# Patient Record
Sex: MALE | Race: WHITE | NOT HISPANIC OR LATINO | Employment: OTHER | URBAN - METROPOLITAN AREA
[De-identification: names, ages, dates, MRNs, and addresses within clinical notes are randomized per-mention and may not be internally consistent; named-entity substitution may affect disease eponyms.]

---

## 2017-08-26 DIAGNOSIS — N13.8 BENIGN PROSTATIC HYPERPLASIA WITH URINARY OBSTRUCTION: ICD-10-CM

## 2017-08-26 DIAGNOSIS — N40.1 BENIGN PROSTATIC HYPERPLASIA WITH URINARY OBSTRUCTION: ICD-10-CM

## 2017-08-28 RX ORDER — TAMSULOSIN HYDROCHLORIDE 0.4 MG/1
CAPSULE ORAL
Qty: 30 CAPSULE | Refills: 0 | Status: SHIPPED | OUTPATIENT
Start: 2017-08-28 | End: 2017-11-22 | Stop reason: SDUPTHER

## 2017-11-22 DIAGNOSIS — N13.8 BENIGN PROSTATIC HYPERPLASIA WITH URINARY OBSTRUCTION: ICD-10-CM

## 2017-11-22 DIAGNOSIS — N40.1 BENIGN PROSTATIC HYPERPLASIA WITH URINARY OBSTRUCTION: ICD-10-CM

## 2017-11-22 RX ORDER — TAMSULOSIN HYDROCHLORIDE 0.4 MG/1
CAPSULE ORAL
Qty: 30 CAPSULE | Refills: 11 | Status: SHIPPED | OUTPATIENT
Start: 2017-11-22 | End: 2018-03-22 | Stop reason: SDUPTHER

## 2018-03-22 PROBLEM — N18.30 CKD (CHRONIC KIDNEY DISEASE) STAGE 3, GFR 30-59 ML/MIN: Status: ACTIVE | Noted: 2018-03-22

## 2018-05-10 PROBLEM — R13.10 DYSPHAGIA: Status: ACTIVE | Noted: 2018-05-10

## 2019-08-12 PROBLEM — M54.9 BACK PAIN: Status: ACTIVE | Noted: 2019-08-12

## 2020-03-11 PROBLEM — M19.041 ARTHRITIS OF BOTH HANDS: Status: ACTIVE | Noted: 2020-03-11

## 2020-03-11 PROBLEM — M19.042 ARTHRITIS OF BOTH HANDS: Status: ACTIVE | Noted: 2020-03-11

## 2021-04-06 PROBLEM — I35.0 AORTIC VALVE STENOSIS: Status: ACTIVE | Noted: 2021-04-06

## 2021-10-27 PROBLEM — Z95.0 PACEMAKER: Status: ACTIVE | Noted: 2021-10-27

## 2021-10-27 PROBLEM — Z99.89 WALKER AS AMBULATION AID: Status: ACTIVE | Noted: 2021-10-27

## 2021-10-27 PROBLEM — I25.118 ATHEROSCLEROTIC HEART DISEASE OF NATIVE CORONARY ARTERY WITH OTHER FORMS OF ANGINA PECTORIS: Status: ACTIVE | Noted: 2021-10-27

## 2021-10-27 PROBLEM — Z95.2 S/P TAVR (TRANSCATHETER AORTIC VALVE REPLACEMENT): Status: ACTIVE | Noted: 2021-10-27

## 2021-10-27 PROBLEM — G62.9 POLYNEUROPATHY: Status: ACTIVE | Noted: 2021-10-27

## 2021-10-27 PROBLEM — U07.1 COVID-19: Status: ACTIVE | Noted: 2021-09-20

## 2022-10-31 PROBLEM — N18.4 CHRONIC KIDNEY DISEASE, STAGE 4 (SEVERE): Status: ACTIVE | Noted: 2022-10-31

## 2022-10-31 PROBLEM — J61 PNEUMOCONIOSIS DUE TO ASBESTOS AND OTHER MINERAL FIBERS: Status: ACTIVE | Noted: 2022-10-31

## 2022-10-31 PROBLEM — I50.32 CHRONIC DIASTOLIC CONGESTIVE HEART FAILURE: Status: ACTIVE | Noted: 2022-10-31

## 2022-10-31 PROBLEM — Z66 DNR (DO NOT RESUSCITATE): Status: ACTIVE | Noted: 2022-10-31

## 2023-05-11 PROBLEM — Z71.89 ADVANCE CARE PLANNING: Status: ACTIVE | Noted: 2023-05-11

## 2023-05-11 PROBLEM — M86.9 KNEE OSTEOMYELITIS, LEFT: Status: ACTIVE | Noted: 2023-05-11

## 2023-05-11 PROBLEM — E87.1 HYPONATREMIA: Status: ACTIVE | Noted: 2023-05-11

## 2023-05-11 PROBLEM — I50.9 CHF (CONGESTIVE HEART FAILURE): Status: ACTIVE | Noted: 2023-05-11

## 2024-02-12 PROBLEM — R26.2 AMBULATORY DYSFUNCTION: Status: ACTIVE | Noted: 2024-02-12

## 2024-02-12 PROBLEM — F10.21 ALCOHOLISM IN REMISSION: Status: ACTIVE | Noted: 2024-02-12

## 2024-02-12 PROBLEM — R64 CACHEXIA: Status: ACTIVE | Noted: 2024-02-12

## 2024-02-12 PROBLEM — I50.33 ACUTE ON CHRONIC DIASTOLIC (CONGESTIVE) HEART FAILURE: Status: ACTIVE | Noted: 2022-10-31

## 2024-03-21 DIAGNOSIS — M17.12 OSTEOARTHRITIS OF LEFT KNEE, UNSPECIFIED OSTEOARTHRITIS TYPE: Primary | ICD-10-CM

## 2024-03-28 ENCOUNTER — OFFICE VISIT (OUTPATIENT)
Dept: ORTHOPEDICS | Facility: CLINIC | Age: 89
End: 2024-03-28
Payer: MEDICARE

## 2024-03-28 ENCOUNTER — HOSPITAL ENCOUNTER (OUTPATIENT)
Dept: RADIOLOGY | Facility: HOSPITAL | Age: 89
Discharge: HOME | End: 2024-03-28
Attending: ORTHOPAEDIC SURGERY
Payer: MEDICARE

## 2024-03-28 VITALS — HEIGHT: 75 IN | BODY MASS INDEX: 23.14 KG/M2 | WEIGHT: 186.06 LBS

## 2024-03-28 DIAGNOSIS — M24.662 ARTHROFIBROSIS OF KNEE JOINT, LEFT: Primary | ICD-10-CM

## 2024-03-28 DIAGNOSIS — M17.12 OSTEOARTHRITIS OF LEFT KNEE, UNSPECIFIED OSTEOARTHRITIS TYPE: ICD-10-CM

## 2024-03-28 PROCEDURE — 73560 X-RAY EXAM OF KNEE 1 OR 2: CPT | Mod: TC,59,PO,RT

## 2024-03-28 PROCEDURE — 99999 PR PBB SHADOW E&M-EST. PATIENT-LVL III: CPT | Mod: PBBFAC,,, | Performed by: ORTHOPAEDIC SURGERY

## 2024-03-28 PROCEDURE — 73560 X-RAY EXAM OF KNEE 1 OR 2: CPT | Mod: 26,RT,, | Performed by: RADIOLOGY

## 2024-03-28 PROCEDURE — 73562 X-RAY EXAM OF KNEE 3: CPT | Mod: 26,LT,, | Performed by: RADIOLOGY

## 2024-03-28 PROCEDURE — 99213 OFFICE O/P EST LOW 20 MIN: CPT | Mod: S$PBB,,, | Performed by: ORTHOPAEDIC SURGERY

## 2024-03-28 PROCEDURE — 99213 OFFICE O/P EST LOW 20 MIN: CPT | Mod: PBBFAC,25,PO | Performed by: ORTHOPAEDIC SURGERY

## 2024-04-04 NOTE — PROGRESS NOTES
Chief Complaint   Patient presents with    Left Knee - Pain     8/10 has had previous surgery       HPI:    This is a 88 y.o. who presents today complaining of left knee pain for 5 months after undergoing TKA at [facility]. Pain is dull. No numbness or tingling. No associated signs or symptoms.      Past Medical History:   Diagnosis Date    Abnormal CBC     Abnormality of gait     Alcohol abuse, unspecified     Anxiety     Asbestosis     Asthma     history of    Atrial fibrillation     COVID-19 09/01/2021    COVID-19 9/20/21 09/20/2021    Depressive disorder, not elsewhere classified     Edema, leg     Embolism and thrombosis of unspecified site     Encounter for blood transfusion 11/1988    Esophageal reflux     Femur fracture     History of transcatheter aortic valve replacement (TAVR)     Kidney failure     stage 3 after coding in 2010 after femur surgery    Lab test positive for detection of COVID-19 virus     Leg pain     Migraine, unspecified, without mention of intractable migraine without mention of status migrainosus     Osteoarthrosis, unspecified whether generalized or localized, lower leg     DJD, KNEE    Osteoarthrosis, unspecified whether generalized or localized, pelvic region and thigh     DJD, HIPS    Osteoporosis     Other testicular hypofunction     Pelvic fracture     Pneumonia due to COVID-19 virus     Renal insufficiency     Shoulder pain     Stroke     Unspecified hypothyroidism       Past Surgical History:   Procedure Laterality Date    ANGIOPLASTY      broken femur      CATARACT EXTRACTION Bilateral     FEMUR SURGERY      BING FILTER PLACEMENT Right 05/2017    hip infus Right 1988    hip infus Left 1996    INSERTION OF PACEMAKER  10/04/2021    JOINT REPLACEMENT      right hip replacement revision     KNEE SURGERY Right     SHOULDER SURGERY Right 01/2014    SHOULDER SURGERY Left 11/2014    TOTAL KNEE ARTHROPLASTY Right     WRIST FRACTURE SURGERY Left 1982      Current Outpatient Medications on  File Prior to Visit   Medication Sig Dispense Refill    acetaminophen (TYLENOL) 500 MG tablet Take 500 mg by mouth every 6 (six) hours as needed for Pain.      temazepam (RESTORIL) 15 mg Cap Take 1 capsule (15 mg total) by mouth nightly as needed (insomnia). 90 capsule 1    gabapentin (NEURONTIN) 100 MG capsule Take 1 capsule (100 mg total) by mouth 2 (two) times daily. (Patient not taking: Reported on 3/28/2024) 60 capsule 11    levothyroxine (SYNTHROID) 25 MCG tablet Take 1 tablet (25 mcg total) by mouth before breakfast. (Patient not taking: Reported on 3/28/2024) 30 tablet 5     No current facility-administered medications on file prior to visit.      Review of patient's allergies indicates:   Allergen Reactions    Moxifloxacin Other (See Comments)     Other reaction(s): sob, edema, visual changes      Family History not pertinent   Social History     Socioeconomic History    Marital status:    Tobacco Use    Smoking status: Former     Current packs/day: 0.00     Types: Cigarettes     Quit date: 1968     Years since quittin.2    Smokeless tobacco: Never   Substance and Sexual Activity    Alcohol use: Yes     Comment: pt reports he is no longer drinking every day just occasionally    Drug use: No    Sexual activity: Not Currently     Social Determinants of Health     Stress: No Stress Concern Present (2019)    [employer] - Occupational Stress Questionnaire     Feeling of Stress : Only a little         Review of Systems:   Constitutional:  Denies fever or chills    Eyes:  Denies change in visual acuity    HENT:  Denies nasal congestion or sore throat    Respiratory:  Denies cough or shortness of breath    Cardiovascular:  Denies chest pain or edema    GI:  Denies abdominal pain, nausea, vomiting, bloody stools or diarrhea    :  Denies dysuria    Integument:  Denies rash    Neurologic:  Denies headache, focal weakness or sensory changes    Endocrine:  Denies  polyuria or polydipsia    Lymphatic:  Denies swollen glands    Psychiatric:  Denies depression or anxiety       Physical Exam:    Constitutional:  Well developed, well nourished, no acute distress, non-toxic appearance    Integument:  Well hydrated, no rash    Lymphatic:  No lymphadenopathy noted    Neurologic:  Alert & oriented x 3,     Psychiatric:  Speech and behavior appropriate    Gi: abdomen soft  Eyes: EOMI   R knee  Exam performed same as contralateral side and is normal  L knee  ROM 20-80. Stable to stress. Overall normal alignment. Incision healed. NVI distally.      X-rays were performed today, personally reviewed by me and findings discussed with the patient.   2 views of the left knee show implants intact in good position      Arthrofibrosis of knee joint, left        I discussed treatment options. RTC 4 weeks

## 2024-11-20 ENCOUNTER — OFFICE VISIT (OUTPATIENT)
Dept: UROLOGY | Facility: CLINIC | Age: 89
End: 2024-11-20
Payer: MEDICARE

## 2024-11-20 VITALS — HEIGHT: 75 IN | BODY MASS INDEX: 23.12 KG/M2

## 2024-11-20 DIAGNOSIS — N40.1 BENIGN PROSTATIC HYPERPLASIA WITH INCOMPLETE BLADDER EMPTYING: ICD-10-CM

## 2024-11-20 DIAGNOSIS — R39.198 DIFFICULTY URINATING: Primary | ICD-10-CM

## 2024-11-20 DIAGNOSIS — R39.14 BENIGN PROSTATIC HYPERPLASIA WITH INCOMPLETE BLADDER EMPTYING: ICD-10-CM

## 2024-11-20 LAB
BILIRUBIN, UA POC OHS: NEGATIVE
BLOOD, UA POC OHS: NEGATIVE
CLARITY, UA POC OHS: CLEAR
COLOR, UA POC OHS: YELLOW
GLUCOSE, UA POC OHS: NEGATIVE
KETONES, UA POC OHS: NEGATIVE
LEUKOCYTES, UA POC OHS: NEGATIVE
NITRITE, UA POC OHS: NEGATIVE
PH, UA POC OHS: 7
POC RESIDUAL URINE VOLUME: 505 ML (ref 0–100)
PROTEIN, UA POC OHS: NEGATIVE
SPECIFIC GRAVITY, UA POC OHS: 1.01
UROBILINOGEN, UA POC OHS: 0.2

## 2024-11-20 PROCEDURE — 99999PBSHW POCT URINALYSIS(INSTRUMENT): Mod: PBBFAC,,,

## 2024-11-20 PROCEDURE — 99203 OFFICE O/P NEW LOW 30 MIN: CPT | Mod: S$PBB,,,

## 2024-11-20 PROCEDURE — 87086 URINE CULTURE/COLONY COUNT: CPT

## 2024-11-20 PROCEDURE — 99999PBSHW PR PBB SHADOW TECHNICAL ONLY FILED TO HB: Mod: PBBFAC,,,

## 2024-11-20 PROCEDURE — 51798 US URINE CAPACITY MEASURE: CPT | Mod: PBBFAC,PO

## 2024-11-20 PROCEDURE — 81003 URINALYSIS AUTO W/O SCOPE: CPT | Mod: PBBFAC,PO

## 2024-11-20 PROCEDURE — 99999PBSHW POCT BLADDER SCAN: Mod: PBBFAC,,,

## 2024-11-20 PROCEDURE — 99999 PR PBB SHADOW E&M-EST. PATIENT-LVL II: CPT | Mod: PBBFAC,,,

## 2024-11-20 PROCEDURE — 99212 OFFICE O/P EST SF 10 MIN: CPT | Mod: PBBFAC,PO

## 2024-11-20 RX ORDER — SILODOSIN 4 MG/1
4 CAPSULE ORAL DAILY
Qty: 90 CAPSULE | Refills: 3 | Status: SHIPPED | OUTPATIENT
Start: 2024-11-20 | End: 2025-11-20

## 2024-11-20 NOTE — PROGRESS NOTES
[facility] Note  Staff: ABDIRASHID Garibay    PCP: MD Ashlee    Chief Complaint: Difficulty Urinating    Subjective:        HPI: Laci Whitman is a 88 y.o. male NEW PATIENT presents today for evaluation of difficulty urinating. He has seen urology in the past. He is a poor historian. It appears he has taken rapaflo and flomax in the past but he is unsure why this has been stopped.    He states he has problems urinating. He states he last urinated this morning. He denies dysuria, hematuria, fever, flank pain. We discussed restarting rapaflo and he agrees to this. He denies constipation.     Questions asked the pt during ov today:  Urgency: no, urge incontinence? no  Dysuria: No  Gross Hematuria:No  Straining:No, Hesistancy:Yes , Intermittency:Yes }, Weak stream:Yes     Last PSA Screening:   Lab Results   Component Value Date    PSA <0.06 04/17/2024    PSA 0.35 10/09/2019       History of Kidney Stones?:  no    Constipation issues?:  no    PVR by bladder scan performed by MA today:  505 mL    REVIEW OF SYSTEMS:  Review of Systems   Constitutional: Negative.  Negative for chills and fever.   Gastrointestinal: Negative.  Negative for abdominal pain, constipation, diarrhea, nausea and vomiting.   Genitourinary: Negative.  Negative for dysuria, flank pain, frequency, hematuria and urgency.   Musculoskeletal:  Positive for back pain, falls and joint pain.   Neurological:  Positive for weakness.   Psychiatric/Behavioral:  Positive for memory loss.        PMHx:  Past Medical History:   Diagnosis Date    Abnormal CBC     Abnormality of gait     Alcohol abuse, unspecified     Anxiety     Asbestosis     Asthma     history of    Atrial fibrillation     COVID-19 09/01/2021    COVID-19 9/20/21 09/20/2021    Depressive disorder, not elsewhere classified     Edema, leg     Embolism and thrombosis of unspecified site     Encounter for blood transfusion 11/1988    Esophageal reflux     Femur fracture      History of transcatheter aortic valve replacement (TAVR)     Kidney failure     stage 3 after coding in 2010 after femur surgery    Lab test positive for detection of COVID-19 virus     Leg pain     Migraine, unspecified, without mention of intractable migraine without mention of status migrainosus     Osteoarthrosis, unspecified whether generalized or localized, lower leg     DJD, KNEE    Osteoarthrosis, unspecified whether generalized or localized, pelvic region and thigh     DJD, HIPS    Osteoporosis     Other testicular hypofunction     Pelvic fracture     Pneumonia due to COVID-19 virus     Renal insufficiency     Shoulder pain     Stroke     Unspecified hypothyroidism        PSHx:  Past Surgical History:   Procedure Laterality Date    ANGIOPLASTY      broken femur      CATARACT EXTRACTION Bilateral     FEMUR SURGERY      BING FILTER PLACEMENT Right 05/2017    hip infus Right 1988    hip infus Left 1996    INSERTION OF PACEMAKER  10/04/2021    JOINT REPLACEMENT      right hip replacement revision     KNEE SURGERY Right     KNEE SURGERY Left 11/2023    SHOULDER SURGERY Right 01/2014    SHOULDER SURGERY Left 11/2014    TOTAL KNEE ARTHROPLASTY Right     WRIST FRACTURE SURGERY Left 1982         Soc Hx:  L[facility] in [facility]    Allergies:  Moxifloxacin    Medications: reviewed     Objective:   There were no vitals filed for this visit.    Physical Exam  Constitutional:       Appearance: Normal appearance.   Pulmonary:      Effort: Pulmonary effort is normal.   Abdominal:      General: There is no distension.      Palpations: Abdomen is soft.      Tenderness: There is no abdominal tenderness.   Musculoskeletal:         General: Normal range of motion.      Cervical back: Normal range of motion.      Comments: In motorized wheelchair   Skin:     General: Skin is warm.   Psychiatric:         Mood and Affect: Mood normal.         Behavior: Behavior normal.         LABS REVIEW:  UA today:  cath urine  Color:Clear,  Yellow  Spec. Grav.  1.015  PH  7.0  Negative for leukocytes, nitrates, protein, glucose, ketones, urobili, bili, and blood.    Assessment:       1. Difficulty urinating    2. Benign prostatic hyperplasia with incomplete bladder emptying          Plan:     Cath urine sent for culture  Rapaflo 4mg prescribed to pt today as trial to see if med improves pt's current LUTS.  Benefits, risks and side affects were thoroughly explained to pt today in office with all questions answered.    F/u as needed    [facility]: pending    ABDIRASHID Garibay

## 2024-11-20 NOTE — PROGRESS NOTES
Patient was seen in clinic by DANYEL Mtz today for difficulty urinating. A bladder scan was done and 505 mL was in pts bladder. Patient was catheterized with a 15 Fr catheter using sterile technique.  Approximately 680 mL of clear yellow urine was drained from patients bladder. Pt tolerated catheterization well and had no issues.

## 2024-11-21 LAB — BACTERIA UR CULT: NO GROWTH
